# Patient Record
Sex: MALE | Race: WHITE | NOT HISPANIC OR LATINO | Employment: OTHER | URBAN - METROPOLITAN AREA
[De-identification: names, ages, dates, MRNs, and addresses within clinical notes are randomized per-mention and may not be internally consistent; named-entity substitution may affect disease eponyms.]

---

## 2017-01-09 ENCOUNTER — HISTORICAL (OUTPATIENT)
Dept: ADMINISTRATIVE | Facility: HOSPITAL | Age: 82
End: 2017-01-09

## 2017-02-01 ENCOUNTER — DOCUMENTATION ONLY (OUTPATIENT)
Dept: FAMILY MEDICINE | Facility: CLINIC | Age: 82
End: 2017-02-01

## 2017-02-01 NOTE — PROGRESS NOTES
Pre-Visit Chart Review  For Appointment Scheduled on 2-2-17    Health Maintenance Due   Topic Date Due    TETANUS VACCINE  10/04/1950    Pneumococcal (65+) (2 of 2 - PCV13) 12/23/2015    Influenza Vaccine  08/01/2016

## 2017-02-02 ENCOUNTER — OFFICE VISIT (OUTPATIENT)
Dept: FAMILY MEDICINE | Facility: CLINIC | Age: 82
End: 2017-02-02
Payer: MEDICARE

## 2017-02-02 VITALS
WEIGHT: 212.94 LBS | SYSTOLIC BLOOD PRESSURE: 128 MMHG | TEMPERATURE: 98 F | HEART RATE: 90 BPM | BODY MASS INDEX: 27.33 KG/M2 | HEIGHT: 74 IN | DIASTOLIC BLOOD PRESSURE: 77 MMHG

## 2017-02-02 DIAGNOSIS — G62.9 NEUROPATHY: Primary | ICD-10-CM

## 2017-02-02 DIAGNOSIS — M10.9 GOUT, UNSPECIFIED CAUSE, UNSPECIFIED CHRONICITY, UNSPECIFIED SITE: ICD-10-CM

## 2017-02-02 DIAGNOSIS — C34.11 MALIGNANT NEOPLASM OF UPPER LOBE OF RIGHT LUNG: ICD-10-CM

## 2017-02-02 DIAGNOSIS — Z85.46 HISTORY OF PROSTATE CANCER: ICD-10-CM

## 2017-02-02 DIAGNOSIS — I10 BENIGN HYPERTENSION: ICD-10-CM

## 2017-02-02 DIAGNOSIS — K21.9 GASTROESOPHAGEAL REFLUX DISEASE, ESOPHAGITIS PRESENCE NOT SPECIFIED: ICD-10-CM

## 2017-02-02 PROCEDURE — 1125F AMNT PAIN NOTED PAIN PRSNT: CPT | Mod: S$GLB,,, | Performed by: FAMILY MEDICINE

## 2017-02-02 PROCEDURE — 99999 PR PBB SHADOW E&M-EST. PATIENT-LVL III: CPT | Mod: PBBFAC,,, | Performed by: FAMILY MEDICINE

## 2017-02-02 PROCEDURE — 99499 UNLISTED E&M SERVICE: CPT | Mod: S$GLB,,, | Performed by: FAMILY MEDICINE

## 2017-02-02 PROCEDURE — 1157F ADVNC CARE PLAN IN RCRD: CPT | Mod: S$GLB,,, | Performed by: FAMILY MEDICINE

## 2017-02-02 PROCEDURE — 1159F MED LIST DOCD IN RCRD: CPT | Mod: S$GLB,,, | Performed by: FAMILY MEDICINE

## 2017-02-02 PROCEDURE — 1160F RVW MEDS BY RX/DR IN RCRD: CPT | Mod: S$GLB,,, | Performed by: FAMILY MEDICINE

## 2017-02-02 PROCEDURE — 99214 OFFICE O/P EST MOD 30 MIN: CPT | Mod: S$GLB,,, | Performed by: FAMILY MEDICINE

## 2017-02-02 PROCEDURE — 3074F SYST BP LT 130 MM HG: CPT | Mod: S$GLB,,, | Performed by: FAMILY MEDICINE

## 2017-02-02 PROCEDURE — 3078F DIAST BP <80 MM HG: CPT | Mod: S$GLB,,, | Performed by: FAMILY MEDICINE

## 2017-02-02 RX ORDER — OMEPRAZOLE 40 MG/1
40 CAPSULE, DELAYED RELEASE ORAL DAILY
Qty: 30 CAPSULE | Refills: 11 | Status: SHIPPED | OUTPATIENT
Start: 2017-02-02 | End: 2017-05-29

## 2017-02-02 RX ORDER — ALLOPURINOL 300 MG/1
300 TABLET ORAL DAILY
Qty: 90 TABLET | Refills: 3 | COMMUNITY
Start: 2017-02-02

## 2017-02-02 RX ORDER — GABAPENTIN 300 MG/1
300 CAPSULE ORAL NIGHTLY
Qty: 90 CAPSULE | Refills: 3 | COMMUNITY
Start: 2017-02-02 | End: 2017-05-29

## 2017-02-02 NOTE — MR AVS SNAPSHOT
[facility]  [address]  [place] [address]  Phone: [phone]  Fax: [phone]                  Harshal Hardin   2017 2:40 PM   Office Visit    Description:  Male : 10/4/1932   Provider:  Toshia Amezcua MD   Department:  [facility]           Reason for Visit     Establish Care           Diagnoses this Visit        Comments    Neuropathy    -  Primary     Gout, unspecified cause, unspecified chronicity, unspecified site         Gastroesophageal reflux disease, esophagitis presence not specified                To Do List           Future Appointments        Provider Department Dept Phone    2017 3:00 PM Toshia Amezcua MD [facility] [phone]      Goals (5 Years of Data)     None      Follow-Up and Disposition     Return in about 3 months (around 2017).       These Medications        Disp Refills Start End    omeprazole (PRILOSEC) 40 MG capsule 30 capsule 11 2017    Take 1 capsule (40 mg total) by mouth once daily. - Oral    Pharmacy: [id number] - [place] - [address] Ph #: [phone]         [facility] On Call     [facility] On Call Nurse Care Line -  Assistance  Registered nurses in the [facility] provide clinical advisement, health education, appointment booking, and other advisory services.  Call for this free service at [phone].             Medications           Message regarding Medications     Verify the changes and/or additions to your medication regime listed below are the same as discussed with your clinician today.  If any of these changes or additions are incorrect, please notify your healthcare provider.        STOP taking these medications     aspirin (ECOTRIN) 81 MG EC tablet Take 81 mg by mouth once daily.      olmesartan (BENICAR) 20 MG tablet Take 1 tablet (20 mg total) by mouth once daily.           Verify that the below list of medications is an accurate  "representation of the medications you are currently taking.  If none reported, the list may be blank. If incorrect, please contact your healthcare provider. Carry this list with you in case of emergency.           Current Medications     alprazolam (XANAX) 0.5 MG tablet TAKE ONE TABLET BY MOUTH TWICE DAILY AS NEEDED FOR ANXIETY    ELIQUIS 5 mg Tab     lactulose (CHRONULAC) 10 gram/15 mL solution TAKE 30 MLS (20 G TOTAL) BY MOUTH EVERY EVENING.    multivitamin capsule Take 1 capsule by mouth once daily.      omega-3 fatty acids-fish oil (FISH OIL) 360-1,200 mg Cap Take by mouth.      omeprazole (PRILOSEC) 40 MG capsule Take 1 capsule (40 mg total) by mouth once daily.    ondansetron (ZOFRAN) 4 MG tablet TAKE ONE TABLET BY MOUTH EVERY EIGHT HOURS AS NEEDED FOR NAUSEA    oxycodone-acetaminophen (PERCOCET) 5-325 mg per tablet Take 1 tablet by mouth every 8 (eight) hours as needed for Pain.    promethazine-codeine 6.25-10 mg/5 ml (PHENERGAN WITH CODEINE) 6.25-10 mg/5 mL syrup     allopurinol (ZYLOPRIM) 300 MG tablet Take 1 tablet (300 mg total) by mouth once daily.    gabapentin (NEURONTIN) 300 MG capsule Take 1 capsule (300 mg total) by mouth every evening.           Clinical Reference Information           Your Vitals Were     BP Pulse Temp Height Weight BMI    128/77 (BP Location: Right arm, Patient Position: Sitting, BP Method: Automatic) 90 98.1 °F (36.7 °C) (Oral) 6' 2" (1.88 m) 96.6 kg (212 lb 15.4 oz) 27.34 kg/m2      Blood Pressure          Most Recent Value    BP  128/77      Allergies as of 2/2/2017     Pneumococcal 23-valent Polysaccharide Vaccine      Immunizations Administered on Date of Encounter - 2/2/2017     None      [facility] Sign-Up     Activating your [facility] account is as easy as 1-2-3!     1) Visit [email or web address], select Sign Up Now, enter this activation code and your date of birth, then select Next.  [email or web address]  Expires: 3/19/2017  4:22 PM      2) Create a username and password to use " when you visit [facility] in the future and select a security question in case you lose your password and select Next.    3) Enter your e-mail address and click Sign Up!    Additional Information  If you have questions, please e-mail [email or web address] or call [phone] to talk to our [facility] staff. Remember, [facility] is NOT to be used for urgent needs. For medical emergencies, dial 911.         Language Assistance Services     ATTENTION: Language assistance services are available, free of charge. Please call [phone].      ATENCIÓN: Si habla español, tiene a benz disposición servicios gratuitos de asistencia lingüística. Llame al [phone].     CHÚ Ý: N?u b?n nói Ti?ng Vi?t, có các d?ch v? h? tr? ngôn ng? mi?n phí dành cho b?n. G?i s? [phone].         [facility] complies with applicable Federal civil rights laws and does not discriminate on the basis of race, color, national origin, age, disability, or sex.

## 2017-02-05 PROBLEM — C34.90 LUNG CANCER: Status: ACTIVE | Noted: 2017-02-05

## 2017-02-05 NOTE — PROGRESS NOTES
Subjective:       Patient ID: Harshal Hardin is a 84 y.o. male.    Chief Complaint: Establish Care    Patient Active Problem List   Diagnosis    Benign hypertension    History of DVT (deep vein thrombosis)    Asymptomatic hyperuricemia    Chronic constipation    GERD (gastroesophageal reflux disease)    Hyperlipidemia    Abdominal pain, other specified site    Nausea & vomiting    Hypertriglyceridemia    History of prostate cancer    Lung cancer 2015 right apex ONC Dr. Foley   HAs foot pain sharmin-bottoms of his feet feel swollen and burn.  Could not tolerate neurontin due to weakness and leg weakness.  IS going to try it again just 300mg at night.    Has left knee OA    Has lung CA and is under care of Dr. Foley.  Has haad 30 treatments of chemo and radiation.  Has f/u PET scan next week.      HPI  Review of Systems   Constitutional: Negative for fatigue and unexpected weight change.   Respiratory: Negative for chest tightness and shortness of breath.    Cardiovascular: Negative for chest pain, palpitations and leg swelling.   Gastrointestinal: Negative for abdominal pain.   Endocrine: Positive for polyuria.   Musculoskeletal: Negative for arthralgias.   Neurological: Positive for weakness and numbness. Negative for dizziness, syncope, light-headedness and headaches.       Objective:      Physical Exam   Constitutional: He is oriented to person, place, and time. He appears well-developed and well-nourished.   Cardiovascular: Normal rate, regular rhythm and normal heart sounds.    Pulmonary/Chest: Effort normal and breath sounds normal.   Musculoskeletal: He exhibits no edema.   Neurological: He is alert and oriented to person, place, and time.   Skin: Skin is warm and dry.   Psychiatric: He has a normal mood and affect.   Nursing note and vitals reviewed.      Assessment:       1. Neuropathy    2. Gout, unspecified cause, unspecified chronicity, unspecified site    3. Gastroesophageal reflux  disease, esophagitis presence not specified    4. Benign hypertension    5. History of prostate cancer    6. Malignant neoplasm of upper lobe of right lung        Plan:       1. Neuropathy  Start as tolerated:  - gabapentin (NEURONTIN) 300 MG capsule; Take 1 capsule (300 mg total) by mouth every evening.  Dispense: 90 capsule; Refill: 3    2. Gout, unspecified cause, unspecified chronicity, unspecified site  Controlled on current medications.  Continue current medications.    - allopurinol (ZYLOPRIM) 300 MG tablet; Take 1 tablet (300 mg total) by mouth once daily.  Dispense: 90 tablet; Refill: 3    3. Gastroesophageal reflux disease, esophagitis presence not specified  Controlled on current medications.  Continue current medications.    - omeprazole (PRILOSEC) 40 MG capsule; Take 1 capsule (40 mg total) by mouth once daily.  Dispense: 30 capsule; Refill: 11    4. Benign hypertension  Controlled on current medications.  Continue current medications.      5. History of prostate cancer  In remission    6. Malignant neoplasm of upper lobe of right lung  Cont Onc care with Dr. Foley    [facility] goal documentation:  Patient readiness: acceptance and barriers:readiness  During the course of the visit the patient was educated and counseled about the following: Hypertension:   Dietary sodium restriction.  Regular aerobic exercise.  Goals: Hypertension: Reduce Blood Pressure  Goal/Outcomes met:Hypertension  The following self management tools provided:none  Patient Instructions (the written plan) was given to the patient/family: Yes  Time spent with patient: 20 minutes    Patient with be reevaluated in 3 months or sooner prn    Greater than 50% of this visit was spent counseling as described in above documentation:Yes

## 2017-02-06 ENCOUNTER — TELEPHONE (OUTPATIENT)
Dept: FAMILY MEDICINE | Facility: CLINIC | Age: 82
End: 2017-02-06

## 2017-02-06 NOTE — TELEPHONE ENCOUNTER
Mr. Hardin fell 2 days ago per wife. He is having rib pain, deny head involvement. He is not having shortness of breath or difficulty breathing. They can not go to the ER. They have no family to help them. [place] and could easily come to the clinic for an xray. He is taking tylenol for pain. Please advise

## 2017-02-06 NOTE — TELEPHONE ENCOUNTER
Please see if urgent care appt or PA Don or one of the other PAs have an opening if I do not within 2 days

## 2017-02-06 NOTE — TELEPHONE ENCOUNTER
----- Message from Dipti Wen LPN sent at 2/6/2017 10:41 AM CST -----  Contact: wife Emmanuelle Hardin fell 2 days ago. He is having rib pain, deny head involvement. He is not having shortness of breath or difficulty breathing. They can not go to the ER. They have no family to help them. They live right around the corner and could easily come to the clinic for an xray. He is taking tylenol for pain.   ----- Message -----     From: Maira Calderón     Sent: 2/6/2017   8:15 AM       To: [facility] Staff    Patient's wife Emmanuelle called stating the patient fell hit is right side rib area   would like to see if an x ray can be ordered     Please call  to advise.    Thanks

## 2017-02-06 NOTE — TELEPHONE ENCOUNTER
----- Message from Maira Calderón sent at 2/6/2017  8:15 AM CST -----  Contact: wife Emmanuelle   Patient's wife Emmanuelle called stating the patient fell hit is right side rib area   would like to see if an x ray can be ordered     Please call  to advise.    Thanks

## 2017-02-07 ENCOUNTER — OFFICE VISIT (OUTPATIENT)
Dept: FAMILY MEDICINE | Facility: CLINIC | Age: 82
End: 2017-02-07
Payer: MEDICARE

## 2017-02-07 ENCOUNTER — HOSPITAL ENCOUNTER (OUTPATIENT)
Dept: RADIOLOGY | Facility: CLINIC | Age: 82
Discharge: HOME | End: 2017-02-07
Attending: FAMILY MEDICINE
Payer: MEDICARE

## 2017-02-07 ENCOUNTER — DOCUMENTATION ONLY (OUTPATIENT)
Dept: FAMILY MEDICINE | Facility: CLINIC | Age: 82
End: 2017-02-07

## 2017-02-07 VITALS
HEART RATE: 104 BPM | TEMPERATURE: 98 F | SYSTOLIC BLOOD PRESSURE: 140 MMHG | DIASTOLIC BLOOD PRESSURE: 82 MMHG | HEIGHT: 74 IN | WEIGHT: 215.63 LBS | BODY MASS INDEX: 27.67 KG/M2
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
## 2017-02-07 DIAGNOSIS — S22.31XA CLOSED FRACTURE OF ONE RIB OF RIGHT SIDE, INITIAL ENCOUNTER: ICD-10-CM

## 2017-02-07 DIAGNOSIS — R07.81 RIB PAIN ON RIGHT SIDE: Primary | ICD-10-CM

## 2017-02-07 PROCEDURE — 3079F DIAST BP 80-89 MM HG: CPT | Mod: S$GLB,,, | Performed by: FAMILY MEDICINE

## 2017-02-07 PROCEDURE — 1159F MED LIST DOCD IN RCRD: CPT | Mod: S$GLB,,, | Performed by: FAMILY MEDICINE

## 2017-02-07 PROCEDURE — 71020 XR CHEST PA AND LATERAL: CPT | Mod: 26,,, | Performed by: RADIOLOGY

## 2017-02-07 PROCEDURE — 1157F ADVNC CARE PLAN IN RCRD: CPT | Mod: S$GLB,,, | Performed by: FAMILY MEDICINE

## 2017-02-07 PROCEDURE — 1160F RVW MEDS BY RX/DR IN RCRD: CPT | Mod: S$GLB,,, | Performed by: FAMILY MEDICINE

## 2017-02-07 PROCEDURE — 71100 X-RAY EXAM RIBS UNI 2 VIEWS: CPT | Mod: 26,,, | Performed by: RADIOLOGY

## 2017-02-07 PROCEDURE — 99999 PR PBB SHADOW E&M-EST. PATIENT-LVL II: CPT | Mod: PBBFAC,,, | Performed by: FAMILY MEDICINE

## 2017-02-07 PROCEDURE — 99213 OFFICE O/P EST LOW 20 MIN: CPT | Mod: S$GLB,,, | Performed by: FAMILY MEDICINE

## 2017-02-07 PROCEDURE — 71100 X-RAY EXAM RIBS UNI 2 VIEWS: CPT | Mod: TC,PO

## 2017-02-07 PROCEDURE — 1125F AMNT PAIN NOTED PAIN PRSNT: CPT | Mod: S$GLB,,, | Performed by: FAMILY MEDICINE

## 2017-02-07 PROCEDURE — 71020 XR CHEST PA AND LATERAL: CPT | Mod: TC,PO

## 2017-02-07 PROCEDURE — 3077F SYST BP >= 140 MM HG: CPT | Mod: S$GLB,,, | Performed by: FAMILY MEDICINE

## 2017-02-07 NOTE — PROGRESS NOTES
Pre-Visit Chart Review  For Appointment Scheduled on 2-7-17    Health Maintenance Due   Topic Date Due    TETANUS VACCINE  10[birth year]04/1950    Pneumococcal (65+) (2 of 2 - PCV13) 12/23/2015    Influenza Vaccine  08/01/2016

## 2017-02-07 NOTE — PROGRESS NOTES
Subjective:       Patient ID: Harshal Hardin is a 84 y.o. male.    Chief Complaint: Fall    Patient Active Problem List   Diagnosis    Benign hypertension    History of DVT (deep vein thrombosis)    Asymptomatic hyperuricemia    Chronic constipation    GERD (gastroesophageal reflux disease)    Hyperlipidemia    Abdominal pain, other specified site    Nausea & vomiting    Hypertriglyceridemia    History of prostate cancer    Lung cancer 2015 right apex ONC Dr. Foley   Patient fell at home on 2/4/17.  He had taken a gabapentin 300mg at bedtime.  Woke up at 2 to let the dog out.  When he stood up he fell backwards, lost his balance and hit the right lower back on the low window sill.  He has had pain, swelling and bruising in the area.  No sob or cough.  No abd pain .  Taking tylenol 2 every 6-8 h prn pain.  He has a hard time tolerating narcortics due to constipation.  Taking lactulose but avoids it when he is going to be out during the day.      HPI  Review of Systems   Constitutional: Negative for fatigue and unexpected weight change.   Respiratory: Negative for cough, chest tightness, shortness of breath and wheezing.    Cardiovascular: Positive for chest pain. Negative for palpitations and leg swelling.   Gastrointestinal: Negative for abdominal distention and abdominal pain.   Endocrine: Negative for polydipsia, polyphagia and polyuria.   Musculoskeletal: Negative for arthralgias.   Neurological: Negative for dizziness, syncope, light-headedness and headaches.       Objective:      Physical Exam   Constitutional: He is oriented to person, place, and time. He appears well-developed and well-nourished.   Cardiovascular: Normal rate, regular rhythm and normal heart sounds.    Pulmonary/Chest: Effort normal and breath sounds normal.       Musculoskeletal: He exhibits no edema.   Neurological: He is alert and oriented to person, place, and time.   Skin: Skin is warm and dry.   Psychiatric: He has a  normal mood and affect.   Nursing note and vitals reviewed.      Assessment:       1. Rib pain on right side    2. Closed fracture of one rib of right side, initial encounter        Plan:       1. Rib pain on right side  Declines pain meds.  USe tylenol prn    2. Closed fracture of one rib of right side, initial encounter  Counseled patient on risk of pneumonia and need for frequent deep inspiration.  Fall precautions    Reeval as planned

## 2017-02-15 ENCOUNTER — TELEPHONE (OUTPATIENT)
Dept: FAMILY MEDICINE | Facility: CLINIC | Age: 82
End: 2017-02-15

## 2017-02-15 NOTE — TELEPHONE ENCOUNTER
----- Message from Jordana Youssef sent at 2/15/2017 11:13 AM CST -----  Contact: wife Mrs Hardin [phone]  Patient's wife called and asked for a Rolator walker he has fallen twice.he needs to use this when he goes out and about.

## 2017-02-21 ENCOUNTER — TELEPHONE (OUTPATIENT)
Dept: FAMILY MEDICINE | Facility: CLINIC | Age: 82
End: 2017-02-21

## 2017-02-21 DIAGNOSIS — R26.81 UNSTEADY GAIT: Primary | ICD-10-CM

## 2017-03-07 ENCOUNTER — HISTORICAL (OUTPATIENT)
Dept: ADMINISTRATIVE | Facility: HOSPITAL | Age: 82
End: 2017-03-07

## 2017-03-07 LAB — GLUCOSE SERPL-MCNC: 93 MG/DL (ref 70–99)

## 2017-04-18 RX ORDER — ALPRAZOLAM 0.5 MG/1
0.5 TABLET ORAL 2 TIMES DAILY PRN
Qty: 60 TABLET | Refills: 0 | OUTPATIENT
Start: 2017-04-18

## 2017-04-21 RX ORDER — LACTULOSE 10 G/15ML
SOLUTION ORAL; RECTAL
Qty: 946 ML | Refills: 0 | Status: SHIPPED | OUTPATIENT
Start: 2017-04-21 | End: 2017-06-09 | Stop reason: SDUPTHER

## 2017-05-11 RX ORDER — SODIUM CHLORIDE 0.9 % (FLUSH) 0.9 %
10 SYRINGE (ML) INJECTION
Status: CANCELLED | OUTPATIENT
Start: 2017-05-18

## 2017-05-11 RX ORDER — SODIUM CHLORIDE 0.9 % (FLUSH) 0.9 %
10 SYRINGE (ML) INJECTION
Status: CANCELLED | OUTPATIENT
Start: 2017-05-11

## 2017-05-11 RX ORDER — HEPARIN 100 UNIT/ML
500 SYRINGE INTRAVENOUS
Status: CANCELLED | OUTPATIENT
Start: 2017-05-18

## 2017-05-11 RX ORDER — HEPARIN 100 UNIT/ML
500 SYRINGE INTRAVENOUS
Status: CANCELLED | OUTPATIENT
Start: 2017-05-11

## 2017-05-12 ENCOUNTER — TELEPHONE (OUTPATIENT)
Dept: HEMATOLOGY/ONCOLOGY | Facility: CLINIC | Age: 82
End: 2017-05-12

## 2017-05-12 NOTE — TELEPHONE ENCOUNTER
Pt called office stating he had blurred vision due to his chemotherapy. I spoke with Jordana who informed pt to see his eye dr prior to next chemo treatment.

## 2017-05-14 ENCOUNTER — DOCUMENTATION ONLY (OUTPATIENT)
Dept: FAMILY MEDICINE | Facility: CLINIC | Age: 82
End: 2017-05-14

## 2017-05-14 NOTE — PROGRESS NOTES
Pre-Visit Chart Review  For Appointment Scheduled on 05/19/2017    Health Maintenance Due   Topic Date Due    TETANUS VACCINE  10/04/1950    Pneumococcal (65+) (2 of 2 - PCV13) 12/23/2015

## 2017-05-24 RX ORDER — ALPRAZOLAM 0.5 MG/1
TABLET ORAL
Qty: 60 TABLET | Refills: 0 | Status: SHIPPED | OUTPATIENT
Start: 2017-05-24 | End: 2017-07-05 | Stop reason: SDUPTHER

## 2017-05-26 ENCOUNTER — DOCUMENTATION ONLY (OUTPATIENT)
Dept: FAMILY MEDICINE | Facility: CLINIC | Age: 82
End: 2017-05-26

## 2017-05-26 NOTE — PROGRESS NOTES
Pre-Visit Chart Review  For Appointment Scheduled on 05/29/2017    Health Maintenance Due   Topic Date Due    TETANUS VACCINE  10[birth year]04/1950    Pneumococcal (65+) (2 of 2 - PCV13) 12/23/2015

## 2017-05-28 RX ORDER — SODIUM CHLORIDE 0.9 % (FLUSH) 0.9 %
10 SYRINGE (ML) INJECTION
Status: CANCELLED | OUTPATIENT
Start: 2017-06-01

## 2017-05-28 RX ORDER — HEPARIN 100 UNIT/ML
500 SYRINGE INTRAVENOUS
Status: CANCELLED | OUTPATIENT
Start: 2017-06-01

## 2017-05-29 ENCOUNTER — OFFICE VISIT (OUTPATIENT)
Dept: FAMILY MEDICINE | Facility: CLINIC | Age: 82
End: 2017-05-29
Payer: MEDICARE

## 2017-05-29 ENCOUNTER — DOCUMENTATION ONLY (OUTPATIENT)
Dept: FAMILY MEDICINE | Facility: CLINIC | Age: 82
End: 2017-05-29

## 2017-05-29 ENCOUNTER — TELEPHONE (OUTPATIENT)
Dept: FAMILY MEDICINE | Facility: CLINIC | Age: 82
End: 2017-05-29

## 2017-05-29 VITALS
SYSTOLIC BLOOD PRESSURE: 104 MMHG | BODY MASS INDEX: 25.52 KG/M2 | DIASTOLIC BLOOD PRESSURE: 63 MMHG | HEIGHT: 74 IN | TEMPERATURE: 98 F | WEIGHT: 198.88 LBS | HEART RATE: 101 BPM

## 2017-05-29 DIAGNOSIS — Z23 NEED FOR TDAP VACCINATION: ICD-10-CM

## 2017-05-29 DIAGNOSIS — I10 BENIGN HYPERTENSION: ICD-10-CM

## 2017-05-29 DIAGNOSIS — H53.139: Primary | ICD-10-CM

## 2017-05-29 DIAGNOSIS — C34.11 MALIGNANT NEOPLASM OF UPPER LOBE OF RIGHT LUNG: ICD-10-CM

## 2017-05-29 DIAGNOSIS — Z23 NEED FOR VACCINATION WITH 13-POLYVALENT PNEUMOCOCCAL CONJUGATE VACCINE: ICD-10-CM

## 2017-05-29 DIAGNOSIS — Z86.718 HISTORY OF DVT (DEEP VEIN THROMBOSIS): ICD-10-CM

## 2017-05-29 PROCEDURE — 1126F AMNT PAIN NOTED NONE PRSNT: CPT | Mod: S$GLB,,, | Performed by: FAMILY MEDICINE

## 2017-05-29 PROCEDURE — 99499 UNLISTED E&M SERVICE: CPT | Mod: S$GLB,,, | Performed by: FAMILY MEDICINE

## 2017-05-29 PROCEDURE — 1159F MED LIST DOCD IN RCRD: CPT | Mod: S$GLB,,, | Performed by: FAMILY MEDICINE

## 2017-05-29 PROCEDURE — 99999 PR PBB SHADOW E&M-EST. PATIENT-LVL III: CPT | Mod: PBBFAC,,, | Performed by: FAMILY MEDICINE

## 2017-05-29 PROCEDURE — 99214 OFFICE O/P EST MOD 30 MIN: CPT | Mod: S$GLB,,, | Performed by: FAMILY MEDICINE

## 2017-05-29 NOTE — TELEPHONE ENCOUNTER
----- Message from Toshia Amezcua MD sent at 5/29/2017  1:11 PM CDT -----  Get eye exam report Dr. Giron

## 2017-05-29 NOTE — PROGRESS NOTES
Pre-Visit Chart Review  For Appointment Scheduled on 5-29-17    Health Maintenance Due   Topic Date Due    TETANUS VACCINE  10[birth year]04/1950    Pneumococcal (65+) (2 of 2 - PCV13) 12/23/2015

## 2017-05-29 NOTE — PROGRESS NOTES
Subjective:       Patient ID: Harshal Hardin is a 84 y.o. male.    Chief Complaint: Eye Problem    Patient Active Problem List   Diagnosis    Benign hypertension    History of DVT (deep vein thrombosis)    Asymptomatic hyperuricemia    Chronic constipation    GERD (gastroesophageal reflux disease)    Hyperlipidemia    Abdominal pain, other specified site    Nausea & vomiting    Hypertriglyceridemia    History of prostate cancer    Lung cancer 2015 right apex ONC Dr. Foley   Under treatment for lung cancer Onc [facility].  Has lost 25lbs since 6/16    Dr. Giron did eye exam due to blurry vision from chemo.  Found a plaque in the eye and was concerned about embolic source-recommended work up        HPI  Review of Systems   Constitutional: Negative for fatigue and unexpected weight change.   Respiratory: Negative for chest tightness and shortness of breath.    Cardiovascular: Negative for chest pain, palpitations and leg swelling.   Gastrointestinal: Negative for abdominal pain.   Musculoskeletal: Negative for arthralgias.   Neurological: Negative for dizziness, syncope, light-headedness and headaches.       Objective:      Physical Exam   Constitutional: He is oriented to person, place, and time. He appears well-developed and well-nourished.   Cardiovascular: Normal rate, regular rhythm and normal heart sounds.    Pulmonary/Chest: Effort normal and breath sounds normal.   Musculoskeletal: He exhibits no edema.   Neurological: He is alert and oriented to person, place, and time.   Skin: Skin is warm and dry.   Psychiatric: He has a normal mood and affect.   Nursing note and vitals reviewed.      Assessment:       1. Acute loss of vision, unspecified laterality    2. Need for Tdap vaccination    3. Need for vaccination with 13-polyvalent pneumococcal conjugate vaccine    4. Benign hypertension    5. Malignant neoplasm of upper lobe of right lung    6. History of DVT (deep vein thrombosis)        Plan:        1. Acute loss of vision, unspecified laterality  Work up and treat as indicated  - US Carotid Bilateral; Future  - 2D Echo w/ Color Flow Doppler; Future  - CT Head Without Contrast; Future    2. Need for Tdap vaccination  Immunize today.  Counseled patient on risks, benefits and side effects.  Patient elected to proceed with vaccination.      3. Need for vaccination with 13-polyvalent pneumococcal conjugate vaccine  Immunize today.  Counseled patient on risks, benefits and side effects.  Patient elected to proceed with vaccination.      4. Benign hypertension  Controlled on current medications.  Continue current medications.      5. Malignant neoplasm of upper lobe of right lung  Cont onc treatment and monitoring.  Counseled patient on nutritional supplementation for support and to prevent further unintentional weight loss    6. History of DVT (deep vein thrombosis)  Cont xarelto    [facility] goal documentation:  Patient readiness: acceptance and barriers:readiness  During the course of the visit the patient was educated and counseled about the following: Hypertension:   Dietary sodium restriction.  Regular aerobic exercise.  Goals: Hypertension: Reduce Blood Pressure  Goal/Outcomes met:Hypertension  The following self management tools provided:none  Patient Instructions (the written plan) was given to the patient/family: Yes  Time spent with patient: 20 minutes    Patient with be reevaluated in 3 months or sooner prn    Greater than 50% of this visit was spent counseling as described in above documentation:Yes

## 2017-05-31 ENCOUNTER — CLINICAL SUPPORT (OUTPATIENT)
Dept: CARDIOLOGY | Facility: CLINIC | Age: 82
End: 2017-05-31
Payer: MEDICARE

## 2017-05-31 ENCOUNTER — HOSPITAL ENCOUNTER (OUTPATIENT)
Dept: RADIOLOGY | Facility: CLINIC | Age: 82
Discharge: HOME | End: 2017-05-31
Attending: FAMILY MEDICINE
Payer: MEDICARE

## 2017-05-31 ENCOUNTER — HOSPITAL ENCOUNTER (OUTPATIENT)
Dept: RADIOLOGY | Facility: HOSPITAL | Age: 82
Discharge: HOME | End: 2017-05-31
Attending: FAMILY MEDICINE
Payer: MEDICARE

## 2017-05-31 DIAGNOSIS — H53.139: ICD-10-CM

## 2017-05-31 LAB
DIASTOLIC DYSFUNCTION: NO
ESTIMATED PA SYSTOLIC PRESSURE: 32.38
RETIRED EF AND QEF - SEE NOTES: 65 (ref 55–65)
TRICUSPID VALVE REGURGITATION: NORMAL

## 2017-05-31 PROCEDURE — 70450 CT HEAD/BRAIN W/O DYE: CPT | Mod: TC

## 2017-05-31 PROCEDURE — 70450 CT HEAD/BRAIN W/O DYE: CPT | Mod: 26,,, | Performed by: RADIOLOGY

## 2017-05-31 PROCEDURE — 93306 TTE W/DOPPLER COMPLETE: CPT | Mod: S$GLB,,, | Performed by: INTERNAL MEDICINE

## 2017-05-31 PROCEDURE — 93880 EXTRACRANIAL BILAT STUDY: CPT | Mod: TC,PO

## 2017-05-31 PROCEDURE — 93880 EXTRACRANIAL BILAT STUDY: CPT | Mod: 26,,, | Performed by: RADIOLOGY

## 2017-06-06 ENCOUNTER — OFFICE VISIT (OUTPATIENT)
Dept: HEMATOLOGY/ONCOLOGY | Facility: CLINIC | Age: 82
End: 2017-06-06
Payer: MEDICARE

## 2017-06-06 VITALS
HEART RATE: 101 BPM | SYSTOLIC BLOOD PRESSURE: 138 MMHG | HEIGHT: 74 IN | TEMPERATURE: 98 F | WEIGHT: 202.13 LBS | RESPIRATION RATE: 16 BRPM | DIASTOLIC BLOOD PRESSURE: 68 MMHG | BODY MASS INDEX: 25.94 KG/M2

## 2017-06-06 DIAGNOSIS — C77.1 SECONDARY AND UNSPECIFIED MALIGNANT NEOPLASM OF INTRATHORACIC LYMPH NODES: Chronic | ICD-10-CM

## 2017-06-06 DIAGNOSIS — C34.11 MALIGNANT NEOPLASM OF UPPER LOBE OF RIGHT LUNG: Chronic | ICD-10-CM

## 2017-06-06 DIAGNOSIS — Z86.718 HISTORY OF DVT (DEEP VEIN THROMBOSIS): ICD-10-CM

## 2017-06-06 PROCEDURE — 1126F AMNT PAIN NOTED NONE PRSNT: CPT | Mod: ,,, | Performed by: INTERNAL MEDICINE

## 2017-06-06 PROCEDURE — 1159F MED LIST DOCD IN RCRD: CPT | Mod: ,,, | Performed by: INTERNAL MEDICINE

## 2017-06-06 PROCEDURE — 99214 OFFICE O/P EST MOD 30 MIN: CPT | Mod: ,,, | Performed by: INTERNAL MEDICINE

## 2017-06-06 RX ORDER — HEPARIN 100 UNIT/ML
500 SYRINGE INTRAVENOUS
Status: CANCELLED | OUTPATIENT
Start: 2017-06-08

## 2017-06-06 RX ORDER — SODIUM CHLORIDE 0.9 % (FLUSH) 0.9 %
10 SYRINGE (ML) INJECTION
Status: CANCELLED | OUTPATIENT
Start: 2017-06-15

## 2017-06-06 RX ORDER — HEPARIN 100 UNIT/ML
500 SYRINGE INTRAVENOUS
Status: CANCELLED | OUTPATIENT
Start: 2017-06-15

## 2017-06-06 RX ORDER — SODIUM CHLORIDE 0.9 % (FLUSH) 0.9 %
10 SYRINGE (ML) INJECTION
Status: CANCELLED | OUTPATIENT
Start: 2017-06-08

## 2017-06-06 NOTE — ASSESSMENT & PLAN NOTE
Continues on Xarleto 10mg due to formation of second clot in right leg while on Eliquis.      Patient had small amount of blood in knee effusion. Spoke with Dr. Rosenthal, felt it was very scant and did not recommend taking patient off blood thinner.

## 2017-06-06 NOTE — PROGRESS NOTES
"                                                         PROGRESS NOTE    Subjective:       Patient ID: Harshal Hardin is a 84 y.o. male.    Chief Complaint:  Eye Problem (blurry vision, drainage ) and Lung Cancer      History of Present Illness:   Harshal Hardin is a 84 y.o. male who presents with a history of lung cancer  On carboplatin gemzar.  Chemo held last week due to increased creatinine.        Family and Social history reviewed and is unchanged from 1/8/2015.       ROS:  Review of Systems   Constitutional: Negative for fever and unexpected weight change.   HENT: Negative for nosebleeds.    Respiratory: Negative for chest tightness and shortness of breath.    Cardiovascular: Negative for chest pain.   Gastrointestinal: Negative for abdominal pain and blood in stool.   Genitourinary: Negative for hematuria.   Skin: Negative for rash.   Hematological: Does not bruise/bleed easily.          Current Outpatient Prescriptions:     allopurinol (ZYLOPRIM) 300 MG tablet, Take 1 tablet (300 mg total) by mouth once daily., Disp: 90 tablet, Rfl: 3    alprazolam (XANAX) 0.5 MG tablet, TAKE ONE TABLET BY MOUTH TWICE DAILY AS NEEDED, Disp: 60 tablet, Rfl: 0    lactulose (CHRONULAC) 10 gram/15 mL solution, TAKE 30 MLS (20 G TOTAL) BY MOUTH EVERY EVENING., Disp: 946 mL, Rfl: 0    multivitamin capsule, Take 1 capsule by mouth once daily.  , Disp: , Rfl:     ondansetron (ZOFRAN) 4 MG tablet, TAKE ONE TABLET BY MOUTH EVERY EIGHT HOURS AS NEEDED FOR NAUSEA, Disp: 30 tablet, Rfl: 0    rivaroxaban (XARELTO) 10 mg Tab, Take 10 mg by mouth daily with dinner or evening meal., Disp: , Rfl:         Objective:       Physical Examination:     /68 (BP Location: Left arm, Patient Position: Sitting, BP Method: Automatic)   Pulse 101   Temp 98.1 °F (36.7 °C) (Oral)   Resp 16   Ht 6' 2" (1.88 m)   Wt 91.7 kg (202 lb 1.6 oz)   BMI 25.95 kg/m²     Physical Exam   Constitutional: He is oriented to person, place, and " time. He appears well-developed and well-nourished.   HENT:   Head: Normocephalic and atraumatic.   Right Ear: External ear normal.   Left Ear: External ear normal.   Mouth/Throat: Oropharynx is clear and moist.   Eyes: Conjunctivae are normal. Pupils are equal, round, and reactive to light. No scleral icterus.   Neck: Normal range of motion. Neck supple.   Cardiovascular: Normal rate, regular rhythm and normal heart sounds.  Exam reveals no gallop and no friction rub.    No murmur heard.  Pulmonary/Chest: Effort normal and breath sounds normal. No respiratory distress. He has no rales. He exhibits no tenderness.   Abdominal: Soft. Bowel sounds are normal. He exhibits no distension and no mass. There is no hepatosplenomegaly. There is no tenderness. There is no rebound and no guarding.   Musculoskeletal: He exhibits no edema.   Lymphadenopathy:        Head (right side): No tonsillar adenopathy present.        Head (left side): No tonsillar adenopathy present.     He has no cervical adenopathy.     He has no axillary adenopathy.        Right: No supraclavicular adenopathy present.        Left: No supraclavicular adenopathy present.   Neurological: He is alert and oriented to person, place, and time.   Psychiatric: He has a normal mood and affect. His behavior is normal. Judgment and thought content normal.   Vitals reviewed.      Labs:   No results found for this or any previous visit (from the past 336 hour(s)).  CMP  Sodium   Date Value Ref Range Status   11/19/2015 138 136 - 145 mmol/L Final     Potassium   Date Value Ref Range Status   11/19/2015 4.4 3.5 - 5.1 mmol/L Final     Chloride   Date Value Ref Range Status   11/19/2015 105 95 - 110 mmol/L Final     CO2   Date Value Ref Range Status   11/19/2015 26 23 - 29 mmol/L Final     Glucose   Date Value Ref Range Status   11/19/2015 87 70 - 110 mg/dL Final     BUN, Bld   Date Value Ref Range Status   11/19/2015 27 (H) 8 - 23 mg/dL Final     Creatinine   Date Value  Ref Range Status   11/19/2015 1.3 0.5 - 1.4 mg/dL Final     Calcium   Date Value Ref Range Status   11/19/2015 9.6 8.7 - 10.5 mg/dL Final     Total Protein   Date Value Ref Range Status   11/19/2015 7.0 6.0 - 8.4 g/dL Final     Albumin   Date Value Ref Range Status   11/19/2015 3.3 (L) 3.5 - 5.2 g/dL Final     Total Bilirubin   Date Value Ref Range Status   11/19/2015 0.5 0.1 - 1.0 mg/dL Final     Comment:     For infants and newborns, interpretation of results should be based  on gestational age, weight and in agreement with clinical  observations.  Premature Infant recommended reference ranges:  Up to 24 hours.............<8.0 mg/dL  Up to 48 hours............<12.0 mg/dL  3-5 days..................<15.0 mg/dL  6-29 days.................<15.0 mg/dL       Alkaline Phosphatase   Date Value Ref Range Status   11/19/2015 89 55 - 135 U/L Final     AST   Date Value Ref Range Status   11/19/2015 20 10 - 40 U/L Final     ALT   Date Value Ref Range Status   11/19/2015 18 10 - 44 U/L Final     Anion Gap   Date Value Ref Range Status   11/19/2015 7 (L) 8 - 16 mmol/L Final     eGFR if    Date Value Ref Range Status   11/19/2015 58 (A) >60 mL/min/1.73 m^2 Final     eGFR if non    Date Value Ref Range Status   11/19/2015 50 (A) >60 mL/min/1.73 m^2 Final     Comment:     Calculation used to obtain the estimated glomerular filtration  rate (eGFR) is the CKD-EPI equation. Since race is unknown   in our information system, the eGFR values for   -American and Non--American patients are given   for each creatinine result.       No results found for: CEA  Lab Results   Component Value Date    PSA 0.17 06/24/2010           Assessment/Plan:   Malignant neoplasm of upper lobe of right lung  Patient s/p multiple rounds of chemotherapy and now on carbo gemzar and s/p 11 cycles of this.  Has been tolerating reasonably well but had to be held last week due to creatinine issues.  Patient looks ok  today and his creatinine is now 1.37.  Would proceed with treatment but need to get staging scans again this month.      History of DVT (deep vein thrombosis)  Continues on Xarleto 10mg due to formation of second clot in right leg while on Eliquis.      Patient had small amount of blood in knee effusion. Spoke with Dr. Rosenthal, felt it was very scant and did not recommend taking patient off blood thinner.      Discussion:   I have spent greater than 25 minutes in the care of this patient discussing the issues reflected in the assessment and plan.  Greater than 50% face to face.  All questions answered to the patients satisfaction.    Return in about 4 weeks (around 7/4/2017).      Electronically signed by Rufus Howell

## 2017-06-06 NOTE — ASSESSMENT & PLAN NOTE
Patient s/p multiple rounds of chemotherapy and now on carbo gemzar and s/p 11 cycles of this.  Has been tolerating reasonably well but had to be held last week due to creatinine issues.  Patient looks ok today and his creatinine is now 1.37.  Would proceed with treatment but need to get staging scans again this month.

## 2017-06-09 PROBLEM — I65.29 CAROTID STENOSIS: Status: ACTIVE | Noted: 2017-06-09

## 2017-06-12 ENCOUNTER — TELEPHONE (OUTPATIENT)
Dept: FAMILY MEDICINE | Facility: CLINIC | Age: 82
End: 2017-06-12

## 2017-06-12 RX ORDER — LACTULOSE 10 G/15ML
SOLUTION ORAL; RECTAL
Qty: 946 ML | Refills: 0 | Status: SHIPPED | OUTPATIENT
Start: 2017-06-12 | End: 2017-07-24 | Stop reason: SDUPTHER

## 2017-06-12 NOTE — TELEPHONE ENCOUNTER
Patient advised of CT results and the need for annual monitoring. He will continue his Xarelto for prevention. Patient verbalized understanding.

## 2017-06-12 NOTE — TELEPHONE ENCOUNTER
----- Message from Toshia Amezcua MD sent at 6/9/2017 10:59 AM CDT -----  Notify patient:  Your CT of the head showed moderate shrinkage and chronic changes in the brain from small vessel damage from chronic disease and age but no new findings.    Carotid ultrasound showed 50-69% stenosis of the left ICA and less than 50% narrowing of the right ICA.   It does not meet criteria to require specific treatment but we will need to monitor yearly.    Your echo was essentially normal.      Continue xarelto for stroke prevention.

## 2017-06-27 RX ORDER — SODIUM CHLORIDE 0.9 % (FLUSH) 0.9 %
10 SYRINGE (ML) INJECTION
Status: CANCELLED | OUTPATIENT
Start: 2017-07-06

## 2017-06-27 RX ORDER — HEPARIN 100 UNIT/ML
500 SYRINGE INTRAVENOUS
Status: CANCELLED | OUTPATIENT
Start: 2017-07-13

## 2017-06-27 RX ORDER — HEPARIN 100 UNIT/ML
500 SYRINGE INTRAVENOUS
Status: CANCELLED | OUTPATIENT
Start: 2017-07-06

## 2017-06-27 RX ORDER — SODIUM CHLORIDE 0.9 % (FLUSH) 0.9 %
10 SYRINGE (ML) INJECTION
Status: CANCELLED | OUTPATIENT
Start: 2017-07-13

## 2017-06-28 LAB
ALBUMIN SERPL-MCNC: 3.1 G/DL (ref 3.6–5.1)
ALBUMIN/GLOB SERPL: 0.9 (CALC) (ref 1–2.5)
ALP SERPL-CCNC: 36 U/L (ref 40–115)
ALT SERPL-CCNC: 4 U/L (ref 9–46)
AST SERPL-CCNC: 19 U/L (ref 10–35)
BILIRUB SERPL-MCNC: 0.4 MG/DL (ref 0.2–1.2)
BUN SERPL-MCNC: 20 MG/DL (ref 7–25)
BUN/CREAT SERPL: 16 (CALC) (ref 6–22)
CALCIUM SERPL-MCNC: 8.4 MG/DL (ref 8.6–10.3)
CHLORIDE SERPL-SCNC: 103 MMOL/L (ref 98–110)
CO2 SERPL-SCNC: 27 MMOL/L (ref 20–31)
CREAT SERPL-MCNC: 1.29 MG/DL (ref 0.7–1.11)
ERYTHROCYTE [DISTWIDTH] IN BLOOD BY AUTOMATED COUNT: 20.1 % (ref 11–15)
GFR SERPL CREATININE-BSD FRML MDRD: 51 ML/MIN/1.73M2
GLOBULIN SER CALC-MCNC: 3.3 G/DL (CALC) (ref 1.9–3.7)
GLUCOSE SERPL-MCNC: 96 MG/DL (ref 65–99)
HCT VFR BLD AUTO: 30.7 % (ref 38.5–50)
HGB BLD-MCNC: 9.8 G/DL (ref 13.2–17.1)
MCH RBC QN AUTO: 27.6 PG (ref 27–33)
MCHC RBC AUTO-ENTMCNC: 31.9 G/DL (ref 32–36)
MCV RBC AUTO: 86.6 FL (ref 80–100)
PLATELET # BLD AUTO: 252 THOUSAND/UL (ref 140–400)
PMV BLD REES-ECKER: 9 FL (ref 7.5–12.5)
POTASSIUM SERPL-SCNC: 4.5 MMOL/L (ref 3.5–5.3)
PROT SERPL-MCNC: 6.4 G/DL (ref 6.1–8.1)
RBC # BLD AUTO: 3.54 MILLION/UL (ref 4.2–5.8)
SODIUM SERPL-SCNC: 139 MMOL/L (ref 135–146)
WBC # BLD AUTO: 6.5 THOUSAND/UL (ref 3.8–10.8)

## 2017-07-04 LAB
ALBUMIN SERPL-MCNC: 2.8 G/DL (ref 3.6–5.1)
ALBUMIN/GLOB SERPL: 0.9 (CALC) (ref 1–2.5)
ALP SERPL-CCNC: 47 U/L (ref 40–115)
ALT SERPL-CCNC: 19 U/L (ref 9–46)
AST SERPL-CCNC: 49 U/L (ref 10–35)
BILIRUB SERPL-MCNC: 0.5 MG/DL (ref 0.2–1.2)
BUN SERPL-MCNC: 28 MG/DL (ref 7–25)
BUN/CREAT SERPL: 25 (CALC) (ref 6–22)
CALCIUM SERPL-MCNC: 7.8 MG/DL (ref 8.6–10.3)
CHLORIDE SERPL-SCNC: 103 MMOL/L (ref 98–110)
CO2 SERPL-SCNC: 27 MMOL/L (ref 20–31)
CREAT SERPL-MCNC: 1.13 MG/DL (ref 0.7–1.11)
ERYTHROCYTE [DISTWIDTH] IN BLOOD BY AUTOMATED COUNT: 20.3 % (ref 11–15)
GFR SERPL CREATININE-BSD FRML MDRD: 59 ML/MIN/1.73M2
GLOBULIN SER CALC-MCNC: 3.1 G/DL (CALC) (ref 1.9–3.7)
GLUCOSE SERPL-MCNC: 123 MG/DL (ref 65–99)
HCT VFR BLD AUTO: 28.7 % (ref 38.5–50)
HGB BLD-MCNC: 9.3 G/DL (ref 13.2–17.1)
MCH RBC QN AUTO: 28.6 PG (ref 27–33)
MCHC RBC AUTO-ENTMCNC: 32.4 G/DL (ref 32–36)
MCV RBC AUTO: 88.1 FL (ref 80–100)
PLATELET # BLD AUTO: 366 THOUSAND/UL (ref 140–400)
PMV BLD REES-ECKER: 8.6 FL (ref 7.5–12.5)
POTASSIUM SERPL-SCNC: 4.3 MMOL/L (ref 3.5–5.3)
PROT SERPL-MCNC: 5.9 G/DL (ref 6.1–8.1)
RBC # BLD AUTO: 3.26 MILLION/UL (ref 4.2–5.8)
SODIUM SERPL-SCNC: 135 MMOL/L (ref 135–146)
WBC # BLD AUTO: 5.6 THOUSAND/UL (ref 3.8–10.8)

## 2017-07-05 RX ORDER — ALPRAZOLAM 0.5 MG/1
TABLET ORAL
Qty: 60 TABLET | Refills: 0 | Status: SHIPPED | OUTPATIENT
Start: 2017-07-05

## 2017-07-07 LAB — GLUCOSE SERPL-MCNC: 98 MG/DL (ref 70–99)

## 2017-07-11 ENCOUNTER — OFFICE VISIT (OUTPATIENT)
Dept: HEMATOLOGY/ONCOLOGY | Facility: CLINIC | Age: 82
End: 2017-07-11
Payer: MEDICARE

## 2017-07-11 ENCOUNTER — TELEPHONE (OUTPATIENT)
Dept: HEMATOLOGY/ONCOLOGY | Facility: CLINIC | Age: 82
End: 2017-07-11

## 2017-07-11 VITALS
DIASTOLIC BLOOD PRESSURE: 62 MMHG | WEIGHT: 198.19 LBS | BODY MASS INDEX: 25.45 KG/M2 | RESPIRATION RATE: 18 BRPM | SYSTOLIC BLOOD PRESSURE: 102 MMHG | HEART RATE: 93 BPM | TEMPERATURE: 98 F

## 2017-07-11 DIAGNOSIS — C34.11 MALIGNANT NEOPLASM OF UPPER LOBE OF RIGHT LUNG: Chronic | ICD-10-CM

## 2017-07-11 PROCEDURE — 1159F MED LIST DOCD IN RCRD: CPT | Mod: ,,, | Performed by: INTERNAL MEDICINE

## 2017-07-11 PROCEDURE — 99214 OFFICE O/P EST MOD 30 MIN: CPT | Mod: ,,, | Performed by: INTERNAL MEDICINE

## 2017-07-11 RX ORDER — ONDANSETRON HYDROCHLORIDE 8 MG/1
TABLET, FILM COATED ORAL
COMMUNITY
Start: 2017-06-26

## 2017-07-11 NOTE — PROGRESS NOTES
PROGRESS NOTE    Subjective:       Patient ID: Harshal Hardin is a 84 y.o. male.    Chief Complaint:  Follow-up; Results (labs in epic, pet on chart); and Extremity Weakness      History of Present Illness:   Harshal Hardin is a 84 y.o. male who presents with a history of lung cancer  On carboplatin gemzar.  Chemo held last week due to increased creatinine.          Family and Social history reviewed and is unchanged from 1/8/2015.       ROS:  Review of Systems   Constitutional: Negative for fever and unexpected weight change.   HENT: Negative for nosebleeds.    Respiratory: Negative for chest tightness and shortness of breath.    Cardiovascular: Negative for chest pain.   Gastrointestinal: Negative for abdominal pain and blood in stool.   Genitourinary: Negative for hematuria.   Skin: Negative for rash.   Hematological: Does not bruise/bleed easily.          Current Outpatient Prescriptions:     allopurinol (ZYLOPRIM) 300 MG tablet, Take 1 tablet (300 mg total) by mouth once daily., Disp: 90 tablet, Rfl: 3    alprazolam (XANAX) 0.5 MG tablet, TAKE ONE TABLET BY MOUTH TWICE DAILY AS NEEDED, Disp: 60 tablet, Rfl: 0    lactulose (CHRONULAC) 10 gram/15 mL solution, TAKE 30 MLS (20 G TOTAL) BY MOUTH EVERY EVENING., Disp: 946 mL, Rfl: 0    multivitamin capsule, Take 1 capsule by mouth once daily.  , Disp: , Rfl:     ondansetron (ZOFRAN) 4 MG tablet, TAKE ONE TABLET BY MOUTH EVERY EIGHT HOURS AS NEEDED FOR NAUSEA, Disp: 30 tablet, Rfl: 0    ondansetron (ZOFRAN) 8 MG tablet, , Disp: , Rfl:     rivaroxaban (XARELTO) 10 mg Tab, Take 10 mg by mouth daily with dinner or evening meal., Disp: , Rfl:         Objective:       Physical Examination:     /62   Pulse 93   Temp 98.2 °F (36.8 °C)   Resp 18   Wt 89.9 kg (198 lb 3.2 oz)   BMI 25.45 kg/m²     Physical Exam   Constitutional: He is oriented to person, place, and time. He appears well-developed  and well-nourished.   HENT:   Head: Normocephalic and atraumatic.   Right Ear: External ear normal.   Left Ear: External ear normal.   Mouth/Throat: Oropharynx is clear and moist.   Eyes: Conjunctivae are normal. Pupils are equal, round, and reactive to light. No scleral icterus.   Neck: Normal range of motion. Neck supple.   Cardiovascular: Normal rate, regular rhythm and normal heart sounds.  Exam reveals no gallop and no friction rub.    No murmur heard.  Pulmonary/Chest: Effort normal and breath sounds normal. No respiratory distress. He has no rales. He exhibits no tenderness.   Abdominal: Soft. Bowel sounds are normal. He exhibits no distension and no mass. There is no hepatosplenomegaly. There is no tenderness. There is no rebound and no guarding.   Musculoskeletal: He exhibits no edema.   Lymphadenopathy:        Head (right side): No tonsillar adenopathy present.        Head (left side): No tonsillar adenopathy present.     He has no cervical adenopathy.     He has no axillary adenopathy.        Right: No supraclavicular adenopathy present.        Left: No supraclavicular adenopathy present.   Neurological: He is alert and oriented to person, place, and time.   Psychiatric: He has a normal mood and affect. His behavior is normal. Judgment and thought content normal.   Vitals reviewed.      Labs:   No results found for this or any previous visit (from the past 336 hour(s)).  CMP  Sodium   Date Value Ref Range Status   07/03/2017 135 135 - 146 mmol/L Final     Potassium   Date Value Ref Range Status   07/03/2017 4.3 3.5 - 5.3 mmol/L Final     Chloride   Date Value Ref Range Status   07/03/2017 103 98 - 110 mmol/L Final     CO2   Date Value Ref Range Status   07/03/2017 27 20 - 31 mmol/L Final     Glucose   Date Value Ref Range Status   07/03/2017 123 (H) 65 - 99 mg/dL Final     Comment:                   Fasting reference interval     For someone without known diabetes, a glucose value  between 100 and 125  mg/dL is consistent with  prediabetes and should be confirmed with a  follow-up test.          BUN, Bld   Date Value Ref Range Status   07/03/2017 28 (H) 7 - 25 mg/dL Final     Creatinine   Date Value Ref Range Status   07/03/2017 1.13 (H) 0.70 - 1.11 mg/dL Final     Comment:     For patients >49 years of age, the reference limit  for Creatinine is approximately 13% higher for people  identified as -American.          Calcium   Date Value Ref Range Status   07/03/2017 7.8 (L) 8.6 - 10.3 mg/dL Final     Total Protein   Date Value Ref Range Status   07/03/2017 5.9 (L) 6.1 - 8.1 g/dL Final     Albumin   Date Value Ref Range Status   07/03/2017 2.8 (L) 3.6 - 5.1 g/dL Final     Total Bilirubin   Date Value Ref Range Status   07/03/2017 0.5 0.2 - 1.2 mg/dL Final     Alkaline Phosphatase   Date Value Ref Range Status   07/03/2017 47 40 - 115 U/L Final     AST   Date Value Ref Range Status   07/03/2017 49 (H) 10 - 35 U/L Final     ALT   Date Value Ref Range Status   07/03/2017 19 9 - 46 U/L Final     Anion Gap   Date Value Ref Range Status   11/19/2015 7 (L) 8 - 16 mmol/L Final     eGFR if    Date Value Ref Range Status   07/03/2017 69 > OR = 60 mL/min/1.73m2 Final     eGFR if non    Date Value Ref Range Status   07/03/2017 59 (L) > OR = 60 mL/min/1.73m2 Final     No results found for: CEA  Lab Results   Component Value Date    PSA 0.17 06/24/2010           Assessment/Plan:   Malignant neoplasm of upper lobe of right lung  Pet scan shows worsening disease.  Will have to change therapy.  Discussed use of immunotherapy with either opdivo or keytruda. reiviewed autoimmune risks and overall problems and benefits of the medicine.  Will check his PDL1 status to see if Keytruda would be 1st.  Patient is willing to try something different and understands these risks.  Will try to begin next week if I can get testing back in time.      Discussion:   I have spent greater than 25 minutes in the  care of this patient discussing the issues reflected in the assessment and plan.  Greater than 50% face to face.  All questions answered to the patients satisfaction.    Return in about 3 weeks (around 8/1/2017).      Electronically signed by Rufus Howell

## 2017-07-11 NOTE — ASSESSMENT & PLAN NOTE
Pet scan shows worsening disease.  Will have to change therapy.  Discussed use of immunotherapy with either opdivo or keytruda. reiviewed autoimmune risks and overall problems and benefits of the medicine.  Will check his PDL1 status to see if Keytruda would be 1st.  Patient is willing to try something different and understands these risks.  Will try to begin next week if I can get testing back in time.

## 2017-07-11 NOTE — TELEPHONE ENCOUNTER
----- Message from Selina Nunn sent at 7/11/2017  3:14 PM CDT -----  Contact: Reyna- daughter [phone]  Patient came to see you today however he doesn't remember anything from the appointment.His daughter is out of town and is requesting you call his about the appointment. Please call at [phone]. Thanks

## 2017-07-11 NOTE — TELEPHONE ENCOUNTER
Left message letting daughter know that she needs to be added to dads authorize personnel so that we will be able to share any information with her.

## 2017-07-12 RX ORDER — SODIUM CHLORIDE 0.9 % (FLUSH) 0.9 %
10 SYRINGE (ML) INJECTION
Status: CANCELLED | OUTPATIENT
Start: 2017-07-20

## 2017-07-12 RX ORDER — HEPARIN 100 UNIT/ML
500 SYRINGE INTRAVENOUS
Status: CANCELLED | OUTPATIENT
Start: 2017-07-20

## 2017-07-13 NOTE — TELEPHONE ENCOUNTER
Spoke w/ Reyna and let her know that in order for us to give us any information we have to have a authorization of release on file.     We can not give any information on the pt's current condition.

## 2017-07-19 LAB
ALBUMIN SERPL-MCNC: 2.9 G/DL (ref 3.6–5.1)
ALBUMIN/GLOB SERPL: 0.9 (CALC) (ref 1–2.5)
ALP SERPL-CCNC: 41 U/L (ref 40–115)
ALT SERPL-CCNC: 4 U/L (ref 9–46)
AST SERPL-CCNC: 18 U/L (ref 10–35)
BILIRUB SERPL-MCNC: 0.4 MG/DL (ref 0.2–1.2)
BUN SERPL-MCNC: 21 MG/DL (ref 7–25)
BUN/CREAT SERPL: 14 (CALC) (ref 6–22)
CALCIUM SERPL-MCNC: 8.2 MG/DL (ref 8.6–10.3)
CHLORIDE SERPL-SCNC: 103 MMOL/L (ref 98–110)
CO2 SERPL-SCNC: 29 MMOL/L (ref 20–31)
CREAT SERPL-MCNC: 1.53 MG/DL (ref 0.7–1.11)
ERYTHROCYTE [DISTWIDTH] IN BLOOD BY AUTOMATED COUNT: 18.1 % (ref 11–15)
GFR SERPL CREATININE-BSD FRML MDRD: 41 ML/MIN/1.73M2
GLOBULIN SER CALC-MCNC: 3.2 G/DL (CALC) (ref 1.9–3.7)
GLUCOSE SERPL-MCNC: 138 MG/DL (ref 65–99)
HCT VFR BLD AUTO: 30.1 % (ref 38.5–50)
HGB BLD-MCNC: 9.5 G/DL (ref 13.2–17.1)
MCH RBC QN AUTO: 28.4 PG (ref 27–33)
MCHC RBC AUTO-ENTMCNC: 31.6 G/DL (ref 32–36)
MCV RBC AUTO: 90.1 FL (ref 80–100)
PLATELET # BLD AUTO: 239 THOUSAND/UL (ref 140–400)
PMV BLD REES-ECKER: 10.8 FL (ref 7.5–12.5)
POTASSIUM SERPL-SCNC: 4.4 MMOL/L (ref 3.5–5.3)
PROT SERPL-MCNC: 6.1 G/DL (ref 6.1–8.1)
RBC # BLD AUTO: 3.34 MILLION/UL (ref 4.2–5.8)
SODIUM SERPL-SCNC: 136 MMOL/L (ref 135–146)
WBC # BLD AUTO: 5.9 THOUSAND/UL (ref 3.8–10.8)

## 2017-07-24 RX ORDER — LACTULOSE 10 G/15ML
SOLUTION ORAL; RECTAL
Qty: 946 ML | Refills: 0 | Status: SHIPPED | OUTPATIENT
Start: 2017-07-24

## 2017-07-25 DIAGNOSIS — T45.1X5A CHEMOTHERAPY-INDUCED NAUSEA: Primary | ICD-10-CM

## 2017-07-25 DIAGNOSIS — R05.9 COUGH: ICD-10-CM

## 2017-07-25 DIAGNOSIS — R11.0 CHEMOTHERAPY-INDUCED NAUSEA: Primary | ICD-10-CM

## 2017-07-25 RX ORDER — PROMETHAZINE HYDROCHLORIDE AND CODEINE PHOSPHATE 6.25; 1 MG/5ML; MG/5ML
SOLUTION ORAL
Qty: 240 ML | Refills: 0 | Status: SHIPPED | OUTPATIENT
Start: 2017-07-25

## 2017-07-26 LAB
ALBUMIN SERPL-MCNC: 2.7 G/DL (ref 3.6–5.1)
ALBUMIN/GLOB SERPL: 0.8 (CALC) (ref 1–2.5)
ALP SERPL-CCNC: 43 U/L (ref 40–115)
ALT SERPL-CCNC: 4 U/L (ref 9–46)
AST SERPL-CCNC: 19 U/L (ref 10–35)
BILIRUB SERPL-MCNC: 0.4 MG/DL (ref 0.2–1.2)
BUN SERPL-MCNC: 20 MG/DL (ref 7–25)
BUN/CREAT SERPL: 13 (CALC) (ref 6–22)
CALCIUM SERPL-MCNC: 8.3 MG/DL (ref 8.6–10.3)
CHLORIDE SERPL-SCNC: 101 MMOL/L (ref 98–110)
CO2 SERPL-SCNC: 27 MMOL/L (ref 20–31)
CREAT SERPL-MCNC: 1.51 MG/DL (ref 0.7–1.11)
ERYTHROCYTE [DISTWIDTH] IN BLOOD BY AUTOMATED COUNT: 17.4 % (ref 11–15)
GFR SERPL CREATININE-BSD FRML MDRD: 42 ML/MIN/1.73M2
GLOBULIN SER CALC-MCNC: 3.4 G/DL (CALC) (ref 1.9–3.7)
GLUCOSE SERPL-MCNC: 115 MG/DL (ref 65–99)
HCT VFR BLD AUTO: 31.6 % (ref 38.5–50)
HGB BLD-MCNC: 9.9 G/DL (ref 13.2–17.1)
MCH RBC QN AUTO: 27.7 PG (ref 27–33)
MCHC RBC AUTO-ENTMCNC: 31.3 G/DL (ref 32–36)
MCV RBC AUTO: 88.3 FL (ref 80–100)
PLATELET # BLD AUTO: 478 THOUSAND/UL (ref 140–400)
PMV BLD REES-ECKER: 11.1 FL (ref 7.5–12.5)
POTASSIUM SERPL-SCNC: 4.5 MMOL/L (ref 3.5–5.3)
PROT SERPL-MCNC: 6.1 G/DL (ref 6.1–8.1)
RBC # BLD AUTO: 3.58 MILLION/UL (ref 4.2–5.8)
SODIUM SERPL-SCNC: 135 MMOL/L (ref 135–146)
WBC # BLD AUTO: 7.8 THOUSAND/UL (ref 3.8–10.8)

## 2017-07-31 ENCOUNTER — HISTORICAL (OUTPATIENT)
Dept: ADMINISTRATIVE | Facility: HOSPITAL | Age: 82
End: 2017-07-31

## 2017-07-31 LAB — PERFORMED BY:: NORMAL

## 2017-08-01 ENCOUNTER — OFFICE VISIT (OUTPATIENT)
Dept: HEMATOLOGY/ONCOLOGY | Facility: CLINIC | Age: 82
End: 2017-08-01
Payer: MEDICARE

## 2017-08-01 VITALS
WEIGHT: 198.13 LBS | SYSTOLIC BLOOD PRESSURE: 113 MMHG | DIASTOLIC BLOOD PRESSURE: 78 MMHG | RESPIRATION RATE: 18 BRPM | HEART RATE: 102 BPM | HEIGHT: 74 IN | BODY MASS INDEX: 25.43 KG/M2 | TEMPERATURE: 98 F

## 2017-08-01 DIAGNOSIS — C34.11 MALIGNANT NEOPLASM OF UPPER LOBE OF RIGHT LUNG: Chronic | ICD-10-CM

## 2017-08-01 DIAGNOSIS — Z86.718 HISTORY OF DVT (DEEP VEIN THROMBOSIS): ICD-10-CM

## 2017-08-01 DIAGNOSIS — C77.1 SECONDARY AND UNSPECIFIED MALIGNANT NEOPLASM OF INTRATHORACIC LYMPH NODES: Primary | Chronic | ICD-10-CM

## 2017-08-01 DIAGNOSIS — M79.89 LEFT ARM SWELLING: ICD-10-CM

## 2017-08-01 LAB
ALBUMIN SERPL-MCNC: 2.7 G/DL (ref 3.6–5.1)
ALBUMIN/GLOB SERPL: 0.8 (CALC) (ref 1–2.5)
ALP SERPL-CCNC: 47 U/L (ref 40–115)
ALT SERPL-CCNC: 6 U/L (ref 9–46)
AST SERPL-CCNC: 26 U/L (ref 10–35)
BILIRUB SERPL-MCNC: 0.5 MG/DL (ref 0.2–1.2)
BUN SERPL-MCNC: 22 MG/DL (ref 7–25)
BUN/CREAT SERPL: 17 (CALC) (ref 6–22)
CALCIUM SERPL-MCNC: 8 MG/DL (ref 8.6–10.3)
CHLORIDE SERPL-SCNC: 101 MMOL/L (ref 98–110)
CO2 SERPL-SCNC: 25 MMOL/L (ref 20–31)
CREAT SERPL-MCNC: 1.33 MG/DL (ref 0.7–1.11)
ERYTHROCYTE [DISTWIDTH] IN BLOOD BY AUTOMATED COUNT: 17.6 % (ref 11–15)
GFR SERPL CREATININE-BSD FRML MDRD: 49 ML/MIN/1.73M2
GLOBULIN SER CALC-MCNC: 3.6 G/DL (CALC) (ref 1.9–3.7)
GLUCOSE SERPL-MCNC: 135 MG/DL (ref 65–99)
HCT VFR BLD AUTO: 33.9 % (ref 38.5–50)
HGB BLD-MCNC: 10.4 G/DL (ref 13.2–17.1)
MCH RBC QN AUTO: 26.7 PG (ref 27–33)
MCHC RBC AUTO-ENTMCNC: 30.7 G/DL (ref 32–36)
MCV RBC AUTO: 87.1 FL (ref 80–100)
PLATELET # BLD AUTO: 407 THOUSAND/UL (ref 140–400)
PMV BLD REES-ECKER: 11.4 FL (ref 7.5–12.5)
POTASSIUM SERPL-SCNC: 4.7 MMOL/L (ref 3.5–5.3)
PROT SERPL-MCNC: 6.3 G/DL (ref 6.1–8.1)
RBC # BLD AUTO: 3.89 MILLION/UL (ref 4.2–5.8)
SODIUM SERPL-SCNC: 133 MMOL/L (ref 135–146)
WBC # BLD AUTO: 9.9 THOUSAND/UL (ref 3.8–10.8)

## 2017-08-01 PROCEDURE — 1159F MED LIST DOCD IN RCRD: CPT | Mod: ,,, | Performed by: INTERNAL MEDICINE

## 2017-08-01 PROCEDURE — 1125F AMNT PAIN NOTED PAIN PRSNT: CPT | Mod: ,,, | Performed by: INTERNAL MEDICINE

## 2017-08-01 PROCEDURE — 99215 OFFICE O/P EST HI 40 MIN: CPT | Mod: ,,, | Performed by: INTERNAL MEDICINE

## 2017-08-01 RX ORDER — TRAVOPROST 0.04 MG/ML
SOLUTION/ DROPS OPHTHALMIC
COMMUNITY
Start: 2017-07-19

## 2017-08-01 NOTE — ASSESSMENT & PLAN NOTE
Patient is s/p once cycle of Opdivo and seems to have tolerated it OK.  I don't believe any of these symptoms are related to side effects of treatment but rather, progressive cancer.  I discussed this with him and his daughter today.  He wants to try another cycle this week and we will proceed with this but if patient's PS does not improve then he may not be a candidate for much further treatment.

## 2017-08-01 NOTE — PROGRESS NOTES
PROGRESS NOTE    Subjective:       Patient ID: Harshal Hardin is a 84 y.o. male.    Chief Complaint:  Follow-up; Results (labs in epic); and Pain (new pain in arm)      History of Present Illness:   Harshal Hardin is a 84 y.o. male who presents for follow up after first opdivo treatment.  Patient states he is feeling terrible.  Increasing fatigue and loss of appetite.  He has also developed severe pain in his left shoulder and arm over the last 2 days assc with swelling.         Family and Social history reviewed and is unchanged from 1/8/2015      ROS:  Review of Systems   Constitutional: Negative for fever and unexpected weight change.   HENT: Negative for nosebleeds.    Respiratory: Negative for chest tightness and shortness of breath.    Cardiovascular: Negative for chest pain.   Gastrointestinal: Negative for abdominal pain and blood in stool.   Genitourinary: Negative for hematuria.   Skin: Negative for rash.   Hematological: Does not bruise/bleed easily.          Current Outpatient Prescriptions:     allopurinol (ZYLOPRIM) 300 MG tablet, Take 1 tablet (300 mg total) by mouth once daily., Disp: 90 tablet, Rfl: 3    alprazolam (XANAX) 0.5 MG tablet, TAKE ONE TABLET BY MOUTH TWICE DAILY AS NEEDED, Disp: 60 tablet, Rfl: 0    lactulose (CHRONULAC) 10 gram/15 mL solution, TAKE ONE TEASPOONFUL BY MOUTH DAILY IN THE EVENING, Disp: 946 mL, Rfl: 0    multivitamin capsule, Take 1 capsule by mouth once daily.  , Disp: , Rfl:     ondansetron (ZOFRAN) 8 MG tablet, , Disp: , Rfl:     promethazine-codeine 6.25-10 mg/5 ml (PHENERGAN WITH CODEINE) 6.25-10 mg/5 mL syrup, TAKE ONE TEASPOONFUL BY MOUTH EVERY SIX HOURS AS NEEDED FOR COUGH, Disp: 240 mL, Rfl: 0    rivaroxaban (XARELTO) 10 mg Tab, Take 10 mg by mouth daily with dinner or evening meal., Disp: , Rfl:     TRAVATAN Z 0.004 % Drop, , Disp: , Rfl:     ondansetron (ZOFRAN) 4 MG tablet, TAKE ONE TABLET  "BY MOUTH EVERY EIGHT HOURS AS NEEDED FOR NAUSEA, Disp: 30 tablet, Rfl: 0        Objective:       Physical Examination:     /78   Pulse 102   Temp 97.8 °F (36.6 °C)   Resp 18   Ht 6' 2" (1.88 m)   Wt 89.9 kg (198 lb 1.6 oz)   BMI 25.43 kg/m²     Physical Exam   Constitutional: He is oriented to person, place, and time.   Appears weak. uncomforable   HENT:   Head: Normocephalic and atraumatic.   Right Ear: External ear normal.   Left Ear: External ear normal.   Mouth/Throat: Oropharynx is clear and moist.   Eyes: Conjunctivae are normal. Pupils are equal, round, and reactive to light. No scleral icterus.   Neck: Normal range of motion. Neck supple.   Cardiovascular: Normal rate, regular rhythm and normal heart sounds.  Exam reveals no gallop and no friction rub.    No murmur heard.  Pulmonary/Chest: Effort normal and breath sounds normal. No respiratory distress. He has no rales. He exhibits no tenderness.   Abdominal: Soft. Bowel sounds are normal. He exhibits no distension and no mass. There is no hepatosplenomegaly. There is no tenderness. There is no rebound and no guarding.   Musculoskeletal: He exhibits no edema.   Lymphadenopathy:        Head (right side): No tonsillar adenopathy present.        Head (left side): No tonsillar adenopathy present.     He has no cervical adenopathy.     He has no axillary adenopathy.        Right: No supraclavicular adenopathy present.        Left: No supraclavicular adenopathy present.   Neurological: He is alert and oriented to person, place, and time.   Psychiatric: He has a normal mood and affect. His behavior is normal. Judgment and thought content normal.   Vitals reviewed.      Labs:   No results found for this or any previous visit (from the past 336 hour(s)).  CMP  Sodium   Date Value Ref Range Status   07/03/2017 135 135 - 146 mmol/L Final     Potassium   Date Value Ref Range Status   07/03/2017 4.3 3.5 - 5.3 mmol/L Final     Chloride   Date Value Ref Range " Status   07/03/2017 103 98 - 110 mmol/L Final     CO2   Date Value Ref Range Status   07/03/2017 27 20 - 31 mmol/L Final     Glucose   Date Value Ref Range Status   07/03/2017 123 (H) 65 - 99 mg/dL Final     Comment:                   Fasting reference interval     For someone without known diabetes, a glucose value  between 100 and 125 mg/dL is consistent with  prediabetes and should be confirmed with a  follow-up test.          BUN, Bld   Date Value Ref Range Status   07/03/2017 28 (H) 7 - 25 mg/dL Final     Creatinine   Date Value Ref Range Status   07/03/2017 1.13 (H) 0.70 - 1.11 mg/dL Final     Comment:     For patients >49 years of age, the reference limit  for Creatinine is approximately 13% higher for people  identified as -American.          Calcium   Date Value Ref Range Status   07/03/2017 7.8 (L) 8.6 - 10.3 mg/dL Final     Total Protein   Date Value Ref Range Status   07/03/2017 5.9 (L) 6.1 - 8.1 g/dL Final     Albumin   Date Value Ref Range Status   07/03/2017 2.8 (L) 3.6 - 5.1 g/dL Final     Total Bilirubin   Date Value Ref Range Status   07/03/2017 0.5 0.2 - 1.2 mg/dL Final     Alkaline Phosphatase   Date Value Ref Range Status   07/03/2017 47 40 - 115 U/L Final     AST   Date Value Ref Range Status   07/03/2017 49 (H) 10 - 35 U/L Final     ALT   Date Value Ref Range Status   07/03/2017 19 9 - 46 U/L Final     Anion Gap   Date Value Ref Range Status   11/19/2015 7 (L) 8 - 16 mmol/L Final     eGFR if    Date Value Ref Range Status   07/03/2017 69 > OR = 60 mL/min/1.73m2 Final     eGFR if non    Date Value Ref Range Status   07/03/2017 59 (L) > OR = 60 mL/min/1.73m2 Final     No results found for: CEA  Lab Results   Component Value Date    PSA 0.17 06/24/2010           Assessment/Plan:     Problem List Items Addressed This Visit     Malignant neoplasm of upper lobe of right lung (Chronic)     Patient is s/p once cycle of Opdivo and seems to have tolerated it OK.  I  don't believe any of these symptoms are related to side effects of treatment but rather, progressive cancer.  I discussed this with him and his daughter today.  He wants to try another cycle this week and we will proceed with this but if patient's PS does not improve then he may not be a candidate for much further treatment.           Left arm swelling     Offered patient pain control but he refuses.  Will take tylenol.  Will get stat ultrasound to see if a DVT exists.  This is complicated as patient is already on Xarelto and states he has been taking it as prescribed.  Will change to Eliquis or coumadin/lovenox if positive.          Relevant Orders    Cardiology Lab US Upper Extremity Arteries Left      Other Visit Diagnoses    None.         Discussion:   I have spent greater than 45 minutes in the care of this patient discussing the issues reflected in the assessment and plan.  Greater than 50% face to face.  All questions answered to the patients satisfaction.    Return in about 1 week (around 8/8/2017).      Electronically signed by Rufus Howell

## 2017-08-01 NOTE — ASSESSMENT & PLAN NOTE
Offered patient pain control but he refuses.  Will take tylenol.  Will get stat ultrasound to see if a DVT exists.  This is complicated as patient is already on Xarelto and states he has been taking it as prescribed.  Will change to Eliquis or coumadin/lovenox if positive.

## 2017-08-02 RX ORDER — HEPARIN 100 UNIT/ML
500 SYRINGE INTRAVENOUS
Status: CANCELLED | OUTPATIENT
Start: 2017-08-03

## 2017-08-02 RX ORDER — SODIUM CHLORIDE 0.9 % (FLUSH) 0.9 %
10 SYRINGE (ML) INJECTION
Status: CANCELLED | OUTPATIENT
Start: 2017-08-03

## 2017-08-02 RX ORDER — RIVAROXABAN 10 MG/1
TABLET, FILM COATED ORAL
Qty: 30 TABLET | Refills: 1 | Status: SHIPPED | OUTPATIENT
Start: 2017-08-02

## 2017-08-02 NOTE — TELEPHONE ENCOUNTER
Spoke with Lashawn about u/s . No evidence of DVT, possible infection. Holding Xarelto for now. Referring patient to an orthopedic for eval.

## 2017-08-10 RX ORDER — SODIUM CHLORIDE 0.9 % (FLUSH) 0.9 %
10 SYRINGE (ML) INJECTION
Status: CANCELLED | OUTPATIENT
Start: 2017-08-31

## 2017-08-10 RX ORDER — HEPARIN 100 UNIT/ML
500 SYRINGE INTRAVENOUS
Status: CANCELLED | OUTPATIENT
Start: 2017-08-17

## 2017-08-10 RX ORDER — HEPARIN 100 UNIT/ML
500 SYRINGE INTRAVENOUS
Status: CANCELLED | OUTPATIENT
Start: 2017-08-31

## 2017-08-10 RX ORDER — SODIUM CHLORIDE 0.9 % (FLUSH) 0.9 %
10 SYRINGE (ML) INJECTION
Status: CANCELLED | OUTPATIENT
Start: 2017-08-17

## 2017-08-16 ENCOUNTER — TREATMENT (OUTPATIENT)
Dept: RADIATION ONCOLOGY | Facility: CLINIC | Age: 82
End: 2017-08-16
Payer: MEDICARE

## 2017-08-16 ENCOUNTER — DOCUMENTATION ONLY (OUTPATIENT)
Dept: RADIATION ONCOLOGY | Facility: CLINIC | Age: 82
End: 2017-08-16

## 2017-08-16 PROCEDURE — 77470 SPECIAL RADIATION TREATMENT: CPT | Mod: ,,, | Performed by: RADIOLOGY

## 2017-08-16 PROCEDURE — 77263 THER RADIOLOGY TX PLNG CPLX: CPT | Mod: ,,, | Performed by: RADIOLOGY

## 2017-08-16 PROCEDURE — 77290 THER RAD SIMULAJ FIELD CPLX: CPT | Mod: ,,, | Performed by: RADIOLOGY

## 2017-08-16 PROCEDURE — 77334 RADIATION TREATMENT AID(S): CPT | Mod: ,,, | Performed by: RADIOLOGY

## 2017-08-16 NOTE — PROGRESS NOTES
Harshal NANCI Lashawn  [id number]  10/4/1932  8/16/2017  No referring provider defined for this encounter.    DIAGNOSIS: IV NSCLC  TREATMENT SITE(S): L radius    INTENT: PALLIATIVE    TREATMENT SETTING: RT ALONE     MODALITY: PHOTON    TECHNIQUE:  3D CONFORMAL RADIOTHERAPY (3DCRT)    IMRT MEDICAL NECESSITY: N/A    I have personally performed treatment planning for the patient, reviewing relevant history/physical and imaging. I have defined GTV, CTV, PTV and organs at risk.     In order to accomplish this plan, I am ordering:  SIMULATION: CT SIMULATION FOR PLACEMENT OF TREATMENT FIELDS    CONTRAST: none    TO ACCOMPLISH REPRODUCIBLE POSITION: VACLOC    DEVICES FOR BEAM SHAPING: CUSTOMIZED MLC    CUSTOMIZED BOLUS: none    IMAGING: DAILY PORTALS    I have ordered a weekly physics check.    SPECIAL PHYSICS CONSULT: NO  REASON: N/A    SPECIAL TREATMENT CIRCUMSTANCE: YES  Concurrent or recent administration of chemotherapeutic agents which are known potent radiosensitizers and thus will require vigilant monitoring for  exaggerated radiation toxicities.    LABS: NONE    ANTICIPATED PRESCRIPTION: 3000cGy/10frx of 300cGy using 6/23X to isocenter    TREATMENT: DAILY    PHYSICIAN: Basil Fisher Jr, MD

## 2017-08-17 PROCEDURE — 77334 RADIATION TREATMENT AID(S): CPT | Mod: ,,, | Performed by: RADIOLOGY

## 2017-08-17 PROCEDURE — 77295 3-D RADIOTHERAPY PLAN: CPT | Mod: ,,, | Performed by: RADIOLOGY

## 2017-08-17 PROCEDURE — 77300 RADIATION THERAPY DOSE PLAN: CPT | Mod: ,,, | Performed by: RADIOLOGY

## 2017-08-21 ENCOUNTER — DOCUMENTATION ONLY (OUTPATIENT)
Dept: RADIATION ONCOLOGY | Facility: CLINIC | Age: 82
End: 2017-08-21

## 2017-08-21 VITALS — DIASTOLIC BLOOD PRESSURE: 59 MMHG | SYSTOLIC BLOOD PRESSURE: 106 MMHG | HEART RATE: 64 BPM | TEMPERATURE: 98 F

## 2017-08-21 DIAGNOSIS — J06.9 UPPER RESPIRATORY TRACT INFECTION, UNSPECIFIED TYPE: Primary | ICD-10-CM

## 2017-08-21 RX ORDER — LEVOFLOXACIN 500 MG/1
500 TABLET, FILM COATED ORAL DAILY
Qty: 7 TABLET | Refills: 0 | Status: SHIPPED | OUTPATIENT
Start: 2017-08-21 | End: 2017-08-28

## 2017-08-21 NOTE — PROGRESS NOTES
Harshal Hardin  [id number]  10/4/1932  8/21/2017    DIAGNOSIS: No diagnosis found.    CURRENT DOSE (cGy): 300 cGy    CONCURRENT CHEMOTHERAPY: no     SUBJECTIVE:  Patient with diagnosis of metastatic lung cancer. We're treating his left radial bone. Patient was hospitalized recently for pneumonia. He recently completed antibiotics but is now having mild temperature at home.. Today's vital signs are stable and he is afebrile.  Pain control with medication    OBJECTIVE:  There were no vitals filed for this visit.  Weight: Stable  Exam  HEENT benign  Neck no adenopathy  Lungs clear  Left arm in a cast, he can move his fingers  KPS: 60%- Requires Occasional Assistance but is Able to Care for Needs    Portal imaging reviewed and approved.    ASSESSMENT AND PLAN:  Patient beginning palliative radiation therapy today for met metastasis to his left radial bone. I'll go ahead and restart his antibiotics Levaquin at this point for 7 days since he did have a recent history of pneumonia.  He'll complete a fraction #10 or 30 gray    Will continue as planned.    Treatment chart and setup reviewed and approved.    PHYSICIAN: Uriel Snyder MD

## 2017-08-23 ENCOUNTER — OFFICE VISIT (OUTPATIENT)
Dept: HEMATOLOGY/ONCOLOGY | Facility: CLINIC | Age: 82
End: 2017-08-23
Payer: MEDICARE

## 2017-08-23 VITALS
HEART RATE: 94 BPM | TEMPERATURE: 98 F | WEIGHT: 186 LBS | SYSTOLIC BLOOD PRESSURE: 123 MMHG | BODY MASS INDEX: 23.87 KG/M2 | HEIGHT: 74 IN | RESPIRATION RATE: 18 BRPM | DIASTOLIC BLOOD PRESSURE: 73 MMHG

## 2017-08-23 DIAGNOSIS — C34.11 MALIGNANT NEOPLASM OF UPPER LOBE OF RIGHT LUNG: Chronic | ICD-10-CM

## 2017-08-23 PROCEDURE — 99215 OFFICE O/P EST HI 40 MIN: CPT | Mod: ,,, | Performed by: INTERNAL MEDICINE

## 2017-08-23 PROCEDURE — 1126F AMNT PAIN NOTED NONE PRSNT: CPT | Mod: ,,, | Performed by: INTERNAL MEDICINE

## 2017-08-23 PROCEDURE — 3078F DIAST BP <80 MM HG: CPT | Mod: ,,, | Performed by: INTERNAL MEDICINE

## 2017-08-23 PROCEDURE — 3074F SYST BP LT 130 MM HG: CPT | Mod: ,,, | Performed by: INTERNAL MEDICINE

## 2017-08-23 PROCEDURE — 3008F BODY MASS INDEX DOCD: CPT | Mod: ,,, | Performed by: INTERNAL MEDICINE

## 2017-08-23 PROCEDURE — 1159F MED LIST DOCD IN RCRD: CPT | Mod: ,,, | Performed by: INTERNAL MEDICINE

## 2017-08-23 RX ORDER — OXYCODONE HYDROCHLORIDE 5 MG/1
TABLET ORAL
COMMUNITY
Start: 2017-08-07

## 2017-08-23 NOTE — ASSESSMENT & PLAN NOTE
Patient now with mets spreading to his left arm and is on radiation therapy.  Patient continues to be very weak and in WC today.  Had a long talk with patient and family and I feel it is time to discontinue aggressive treatment.  Discussed end of life issues and hospice care.  Will begin this when he is finished chemotherapy.  Patient understands this process and is willing to proceed.

## 2017-08-23 NOTE — LETTER
August 23, 2017      Toshia Amezcua MD  [address]  [place] [address]           [facility]  [address]  [address]  [place] [address]  Phone: [phone]  Fax: [phone]          Patient: Harshal Hardin   MR Number: [id number]   YOB: 1932   Date of Visit: 8/23/2017       Dear Dr. Toshia Amezcua:    Thank you for referring Harshal Hardin to me for evaluation. Attached you will find relevant portions of my assessment and plan of care.    If you have questions, please do not hesitate to call me. I look forward to following Harshal Hardin along with you.    Sincerely,    Rufus De La Rosa MD    Enclosure  CC:  No Recipients    If you would like to receive this communication electronically, please contact [email or web address] or [phone] to request more information on [facility] access.    For providers and/or their staff who would like to refer a patient to [facility], please contact us through our one-stop-shop provider referral line, [facility], at [phone].    If you feel you have received this communication in error or would no longer like to receive these types of communications, please e-mail [email or web address]

## 2017-08-23 NOTE — PROGRESS NOTES
PROGRESS NOTE    Subjective:       Patient ID: Harshal Hardin is a 84 y.o. male.    Chief Complaint:  Follow-up and Results (on the chart)      History of Present Illness:   Harshal Hardin is a 84 y.o. male recently found to have mets in his left arm and is now casted and on radiation therapy.  Opdivo has been held during this time.        Family and Social history reviewed and is unchanged from 1/8/2015      ROS:  Review of Systems   Constitutional: Negative for fever and unexpected weight change.   HENT: Negative for nosebleeds.    Respiratory: Negative for chest tightness and shortness of breath.    Cardiovascular: Negative for chest pain.   Gastrointestinal: Negative for abdominal pain and blood in stool.   Genitourinary: Negative for hematuria.   Skin: Negative for rash.   Hematological: Does not bruise/bleed easily.          Current Outpatient Prescriptions:     allopurinol (ZYLOPRIM) 300 MG tablet, Take 1 tablet (300 mg total) by mouth once daily., Disp: 90 tablet, Rfl: 3    alprazolam (XANAX) 0.5 MG tablet, TAKE ONE TABLET BY MOUTH TWICE DAILY AS NEEDED, Disp: 60 tablet, Rfl: 0    lactulose (CHRONULAC) 10 gram/15 mL solution, TAKE ONE TEASPOONFUL BY MOUTH DAILY IN THE EVENING, Disp: 946 mL, Rfl: 0    levoFLOXacin (LEVAQUIN) 500 MG tablet, Take 1 tablet (500 mg total) by mouth once daily., Disp: 7 tablet, Rfl: 0    multivitamin capsule, Take 1 capsule by mouth once daily.  , Disp: , Rfl:     ondansetron (ZOFRAN) 4 MG tablet, TAKE ONE TABLET BY MOUTH EVERY EIGHT HOURS AS NEEDED FOR NAUSEA, Disp: 30 tablet, Rfl: 0    ondansetron (ZOFRAN) 8 MG tablet, , Disp: , Rfl:     oxycodone (ROXICODONE) 5 MG immediate release tablet, , Disp: , Rfl:     promethazine-codeine 6.25-10 mg/5 ml (PHENERGAN WITH CODEINE) 6.25-10 mg/5 mL syrup, TAKE ONE TEASPOONFUL BY MOUTH EVERY SIX HOURS AS NEEDED FOR COUGH, Disp: 240 mL, Rfl: 0    TRAVATAN Z 0.004 % Drop, ,  "Disp: , Rfl:     XARELTO 10 mg Tab, TAKE ONE TABLET BY MOUTH ONCE DAILY, Disp: 30 tablet, Rfl: 1        Objective:       Physical Examination:     /73   Pulse 94   Temp 98.3 °F (36.8 °C)   Resp 18   Ht 6' 2" (1.88 m)   Wt 84.4 kg (186 lb)   BMI 23.88 kg/m²     Physical Exam   Constitutional: He is oriented to person, place, and time.   Appears weak. uncomforable   HENT:   Head: Normocephalic and atraumatic.   Right Ear: External ear normal.   Left Ear: External ear normal.   Mouth/Throat: Oropharynx is clear and moist.   Eyes: Conjunctivae are normal. Pupils are equal, round, and reactive to light. No scleral icterus.   Neck: Normal range of motion. Neck supple.   Cardiovascular: Normal rate, regular rhythm and normal heart sounds.  Exam reveals no gallop and no friction rub.    No murmur heard.  Pulmonary/Chest: Effort normal and breath sounds normal. No respiratory distress. He has no rales. He exhibits no tenderness.   Abdominal: Soft. Bowel sounds are normal. He exhibits no distension and no mass. There is no hepatosplenomegaly. There is no tenderness. There is no rebound and no guarding.   Musculoskeletal: He exhibits no edema.   Lymphadenopathy:        Head (right side): No tonsillar adenopathy present.        Head (left side): No tonsillar adenopathy present.     He has no cervical adenopathy.     He has no axillary adenopathy.        Right: No supraclavicular adenopathy present.        Left: No supraclavicular adenopathy present.   Neurological: He is alert and oriented to person, place, and time.   Psychiatric: He has a normal mood and affect. His behavior is normal. Judgment and thought content normal.   Vitals reviewed.      Labs:   No results found for this or any previous visit (from the past 336 hour(s)).  CMP  Sodium   Date Value Ref Range Status   07/18/2017 136 135 - 146 mmol/L Final     Potassium   Date Value Ref Range Status   07/18/2017 4.4 3.5 - 5.3 mmol/L Final     Chloride   Date " Value Ref Range Status   07/18/2017 103 98 - 110 mmol/L Final     CO2   Date Value Ref Range Status   07/18/2017 29 20 - 31 mmol/L Final     Glucose   Date Value Ref Range Status   07/18/2017 138 (H) 65 - 99 mg/dL Final     Comment:                   Fasting reference interval     For someone without known diabetes, a glucose  value >125 mg/dL indicates that they may have  diabetes and this should be confirmed with a  follow-up test.          BUN, Bld   Date Value Ref Range Status   07/18/2017 21 7 - 25 mg/dL Final     Creatinine   Date Value Ref Range Status   07/18/2017 1.53 (H) 0.70 - 1.11 mg/dL Final     Comment:     For patients >49 years of age, the reference limit  for Creatinine is approximately 13% higher for people  identified as -American.          Calcium   Date Value Ref Range Status   07/18/2017 8.2 (L) 8.6 - 10.3 mg/dL Final     Total Protein   Date Value Ref Range Status   07/18/2017 6.1 6.1 - 8.1 g/dL Final     Albumin   Date Value Ref Range Status   07/18/2017 2.9 (L) 3.6 - 5.1 g/dL Final     Total Bilirubin   Date Value Ref Range Status   07/18/2017 0.4 0.2 - 1.2 mg/dL Final     Alkaline Phosphatase   Date Value Ref Range Status   07/18/2017 41 40 - 115 U/L Final     AST   Date Value Ref Range Status   07/18/2017 18 10 - 35 U/L Final     ALT   Date Value Ref Range Status   07/18/2017 4 (L) 9 - 46 U/L Final     Anion Gap   Date Value Ref Range Status   11/19/2015 7 (L) 8 - 16 mmol/L Final     eGFR if    Date Value Ref Range Status   07/18/2017 48 (L) > OR = 60 mL/min/1.73m2 Final     eGFR if non    Date Value Ref Range Status   07/18/2017 41 (L) > OR = 60 mL/min/1.73m2 Final     No results found for: CEA  Lab Results   Component Value Date    PSA 0.17 06/24/2010           Assessment/Plan:     Problem List Items Addressed This Visit     Malignant neoplasm of upper lobe of right lung (Chronic)     Patient now with mets spreading to his left arm and is on  radiation therapy.  Patient continues to be very weak and in WC today.  Had a long talk with patient and family and I feel it is time to discontinue aggressive treatment.  Discussed end of life issues and hospice care.  Will begin this when he is finished chemotherapy.  Patient understands this process and is willing to proceed.             Other Visit Diagnoses    None.       Hospice has seen patient and will assume care after XRT.   Discussion:   Return if symptoms worsen or fail to improve.      Electronically signed by Rufus Howell

## 2017-08-29 ENCOUNTER — DOCUMENTATION ONLY (OUTPATIENT)
Dept: RADIATION ONCOLOGY | Facility: CLINIC | Age: 82
End: 2017-08-29

## 2017-08-29 NOTE — PROGRESS NOTES
Harshal Hardin  [id number]  10/4/1932  8/29/2017    DIAGNOSIS:     CURRENT DOSE (cGy): 1500 cGy    CONCURRENT CHEMOTHERAPY: no     SUBJECTIVE:  Patient is received 1 week out of a planned two-week course of palliative radiation therapy for stage IV lung cancer.    He is elected to discontinue his treatment course and to go on hospice.    We will close out his chart    OBJECTIVE:  There were no vitals filed for this visit.  Weight:   KPS: 40%- Disabled: Requires Special Care and Assistance     Portal imaging reviewed and approved.    ASSESSMENT AND PLAN:  Patient going to hospice care      Treatment chart and setup reviewed and approved.    PHYSICIAN: Uriel Snyder MD

## 2017-09-08 ENCOUNTER — DOCUMENTATION ONLY (OUTPATIENT)
Dept: FAMILY MEDICINE | Facility: CLINIC | Age: 82
End: 2017-09-08

## 2017-09-08 NOTE — PROGRESS NOTES
Pre-Visit Chart Review  For Appointment Scheduled on 9-11-17    Health Maintenance Due   Topic Date Due    TETANUS VACCINE  10/04/1950    Pneumococcal (65+) (2 of 2 - PCV13) 12/23/2015    Influenza Vaccine  08/01/2017

## 2024-11-30 NOTE — TELEPHONE ENCOUNTER
I cannot print or use token, can we call in to local pharmacy.   
LOV: 2/7/17  LR:8/15/16    Requested from Rex Holcomb on 4/13/17  
Sp[facility] to [id number] called in rx for alprazolam 0.5mg one tab po 2 times daily as needed 60 tabs 0 refills  
Opt out